# Patient Record
Sex: FEMALE | Race: WHITE | Employment: FULL TIME | URBAN - METROPOLITAN AREA
[De-identification: names, ages, dates, MRNs, and addresses within clinical notes are randomized per-mention and may not be internally consistent; named-entity substitution may affect disease eponyms.]

---

## 2018-10-18 PROBLEM — Z23 ENCOUNTER FOR IMMUNIZATION: Status: ACTIVE | Noted: 2018-10-18

## 2018-10-18 PROBLEM — Z34.90 PREGNANCY: Status: ACTIVE | Noted: 2018-10-18

## 2018-10-26 PROBLEM — O23.40 UTI IN PREGNANCY: Status: ACTIVE | Noted: 2018-10-26

## 2018-10-26 PROBLEM — Z28.39 RUBELLA NON-IMMUNE STATUS, ANTEPARTUM: Status: ACTIVE | Noted: 2018-10-26

## 2018-10-26 PROBLEM — O09.899 RUBELLA NON-IMMUNE STATUS, ANTEPARTUM: Status: ACTIVE | Noted: 2018-10-26

## 2019-01-08 PROBLEM — O43.102 PLACENTAL ABNORMALITY IN SECOND TRIMESTER: Status: ACTIVE | Noted: 2019-01-08

## 2019-04-29 PROBLEM — B95.1 POSITIVE GBS TEST: Status: ACTIVE | Noted: 2019-04-29

## 2019-05-24 NOTE — PROGRESS NOTES
Patient ID verified. Allergies, medical history, prenatal record and prior to admission medications verified. Pt instructed to be NPO after midnight. Pt instructed to call @ 1500 for the time to arrive at hospital, come to entrance C and sign in at the registration desk on the 4th floor. Patient instructed to come to hospital sooner if SROM, labor, or concerning symptoms. Patient verbalized understanding. Questions encouraged and answered.

## 2019-05-27 ENCOUNTER — HOSPITAL ENCOUNTER (INPATIENT)
Age: 29
LOS: 3 days | Discharge: HOME | End: 2019-05-30
Attending: OBSTETRICS & GYNECOLOGY | Admitting: OBSTETRICS & GYNECOLOGY
Payer: COMMERCIAL

## 2019-05-27 DIAGNOSIS — Z34.90 ENCOUNTER FOR PLANNED INDUCTION OF LABOR: Primary | ICD-10-CM

## 2019-05-27 PROBLEM — Z3A.40 40 WEEKS GESTATION OF PREGNANCY: Status: ACTIVE | Noted: 2019-05-27

## 2019-05-27 LAB
ABO + RH BLD: NORMAL
BLOOD GROUP ANTIBODIES SERPL: NORMAL
ERYTHROCYTE [DISTWIDTH] IN BLOOD BY AUTOMATED COUNT: 12.1 % (ref 11.9–14.6)
HCT VFR BLD AUTO: 34.8 % (ref 35.8–46.3)
HGB BLD-MCNC: 11.8 G/DL (ref 11.7–15.4)
MCH RBC QN AUTO: 32.5 PG (ref 26.1–32.9)
MCHC RBC AUTO-ENTMCNC: 33.9 G/DL (ref 31.4–35)
MCV RBC AUTO: 95.9 FL (ref 79.6–97.8)
NRBC # BLD: 0 K/UL (ref 0–0.2)
PLATELET # BLD AUTO: 269 K/UL (ref 150–450)
PMV BLD AUTO: 10.7 FL (ref 9.4–12.3)
RBC # BLD AUTO: 3.63 M/UL (ref 4.05–5.2)
SPECIMEN EXP DATE BLD: NORMAL
WBC # BLD AUTO: 9.5 K/UL (ref 4.3–11.1)

## 2019-05-27 PROCEDURE — 65270000029 HC RM PRIVATE

## 2019-05-27 PROCEDURE — 74011250636 HC RX REV CODE- 250/636: Performed by: OBSTETRICS & GYNECOLOGY

## 2019-05-27 PROCEDURE — 86900 BLOOD TYPING SEROLOGIC ABO: CPT

## 2019-05-27 PROCEDURE — 74011000258 HC RX REV CODE- 258: Performed by: OBSTETRICS & GYNECOLOGY

## 2019-05-27 PROCEDURE — 85027 COMPLETE CBC AUTOMATED: CPT

## 2019-05-27 PROCEDURE — 74011250637 HC RX REV CODE- 250/637: Performed by: OBSTETRICS & GYNECOLOGY

## 2019-05-27 RX ORDER — BUTORPHANOL TARTRATE 2 MG/ML
1 INJECTION INTRAMUSCULAR; INTRAVENOUS
Status: DISCONTINUED | OUTPATIENT
Start: 2019-05-27 | End: 2019-05-29 | Stop reason: HOSPADM

## 2019-05-27 RX ORDER — ACETAMINOPHEN 500 MG
1000 TABLET ORAL
Status: DISCONTINUED | OUTPATIENT
Start: 2019-05-27 | End: 2019-05-29

## 2019-05-27 RX ORDER — OXYTOCIN/RINGER'S LACTATE 15/250 ML
250 PLASTIC BAG, INJECTION (ML) INTRAVENOUS ONCE
Status: ACTIVE | OUTPATIENT
Start: 2019-05-27 | End: 2019-05-28

## 2019-05-27 RX ORDER — LIDOCAINE HYDROCHLORIDE 10 MG/ML
1 INJECTION INFILTRATION; PERINEURAL
Status: ACTIVE | OUTPATIENT
Start: 2019-05-27 | End: 2019-05-28

## 2019-05-27 RX ORDER — ONDANSETRON 4 MG/1
4 TABLET, ORALLY DISINTEGRATING ORAL
Status: DISCONTINUED | OUTPATIENT
Start: 2019-05-27 | End: 2019-05-29

## 2019-05-27 RX ORDER — DEXTROSE, SODIUM CHLORIDE, SODIUM LACTATE, POTASSIUM CHLORIDE, AND CALCIUM CHLORIDE 5; .6; .31; .03; .02 G/100ML; G/100ML; G/100ML; G/100ML; G/100ML
125 INJECTION, SOLUTION INTRAVENOUS CONTINUOUS
Status: DISCONTINUED | OUTPATIENT
Start: 2019-05-27 | End: 2019-05-29

## 2019-05-27 RX ORDER — LIDOCAINE HYDROCHLORIDE 20 MG/ML
JELLY TOPICAL
Status: DISPENSED | OUTPATIENT
Start: 2019-05-27 | End: 2019-05-28

## 2019-05-27 RX ORDER — MINERAL OIL
120 OIL (ML) ORAL
Status: ACTIVE | OUTPATIENT
Start: 2019-05-27 | End: 2019-05-28

## 2019-05-27 RX ORDER — SODIUM CHLORIDE 0.9 % (FLUSH) 0.9 %
5-40 SYRINGE (ML) INJECTION AS NEEDED
Status: DISCONTINUED | OUTPATIENT
Start: 2019-05-27 | End: 2019-05-29

## 2019-05-27 RX ORDER — OXYTOCIN/RINGER'S LACTATE 30/500 ML
0-25 PLASTIC BAG, INJECTION (ML) INTRAVENOUS
Status: DISCONTINUED | OUTPATIENT
Start: 2019-05-27 | End: 2019-05-29 | Stop reason: HOSPADM

## 2019-05-27 RX ORDER — ZOLPIDEM TARTRATE 5 MG/1
5 TABLET ORAL
Status: DISCONTINUED | OUTPATIENT
Start: 2019-05-27 | End: 2019-05-29

## 2019-05-27 RX ORDER — SODIUM CHLORIDE 0.9 % (FLUSH) 0.9 %
5-40 SYRINGE (ML) INJECTION EVERY 8 HOURS
Status: DISCONTINUED | OUTPATIENT
Start: 2019-05-27 | End: 2019-05-29

## 2019-05-27 RX ADMIN — MISOPROSTOL 50 MCG: 100 TABLET ORAL at 18:03

## 2019-05-27 RX ADMIN — SODIUM CHLORIDE 5 MILLION UNITS: 900 INJECTION, SOLUTION INTRAVENOUS at 22:11

## 2019-05-27 RX ADMIN — MISOPROSTOL 50 MCG: 100 TABLET ORAL at 22:10

## 2019-05-27 RX ADMIN — SODIUM CHLORIDE, SODIUM LACTATE, POTASSIUM CHLORIDE, CALCIUM CHLORIDE, AND DEXTROSE MONOHYDRATE 125 ML/HR: 600; 310; 30; 20; 5 INJECTION, SOLUTION INTRAVENOUS at 18:04

## 2019-05-27 NOTE — PROGRESS NOTES
Pt admitted to  430 for Cytotec tonight and induction in AM.  IV started and labs sent. SVE 1cm. FHR reactive.   Cytotec 50 given buccal.

## 2019-05-27 NOTE — PROGRESS NOTES
Spoke with Dr. Kimberlee Dexter on the phone regarding patient's status and POC. MD stated to give 50mcg cytotec Q4hr PRN, and start PCN with second dose of cytotec. Per MD patient may receive epidural and RN may start pit around 0500 if SVE >3cm. PRN med orders received. Will place orders.

## 2019-05-28 ENCOUNTER — ANESTHESIA (OUTPATIENT)
Dept: LABOR AND DELIVERY | Age: 29
End: 2019-05-28
Payer: COMMERCIAL

## 2019-05-28 ENCOUNTER — ANESTHESIA EVENT (OUTPATIENT)
Dept: LABOR AND DELIVERY | Age: 29
End: 2019-05-28
Payer: COMMERCIAL

## 2019-05-28 LAB
ARTERIAL PATENCY WRIST A: ABNORMAL
ARTERIAL PATENCY WRIST A: ABNORMAL
BASE DEFICIT BLD-SCNC: 6 MMOL/L
BASE DEFICIT BLD-SCNC: 7 MMOL/L
BDY SITE: ABNORMAL
BDY SITE: ABNORMAL
BODY TEMPERATURE: 98.6
BODY TEMPERATURE: 98.6
CO2 BLD-SCNC: 21 MMOL/L
CO2 BLD-SCNC: 23 MMOL/L
COLLECT TIME,HTIME: 2250
COLLECT TIME,HTIME: 2250
GAS FLOW.O2 O2 DELIVERY SYS: ABNORMAL L/MIN
GAS FLOW.O2 O2 DELIVERY SYS: ABNORMAL L/MIN
HCO3 BLD-SCNC: 21.1 MMOL/L (ref 22–26)
HCO3 BLDV-SCNC: 19.6 MMOL/L (ref 23–28)
PCO2 BLDCO: 39 MMHG (ref 32–68)
PCO2 BLDCO: 54 MMHG (ref 32–68)
PH BLDCO: 7.2 [PH] (ref 7.15–7.38)
PH BLDCO: 7.31 [PH] (ref 7.15–7.38)
PO2 BLDCO: 12 MMHG
PO2 BLDCO: 26 MMHG
SAO2 % BLD: 9 % (ref 95–98)
SAO2 % BLDV: 42 % (ref 65–88)
SERVICE CMNT-IMP: ABNORMAL
SERVICE CMNT-IMP: ABNORMAL
SPECIMEN TYPE: ABNORMAL
SPECIMEN TYPE: ABNORMAL

## 2019-05-28 PROCEDURE — 74011250637 HC RX REV CODE- 250/637: Performed by: OBSTETRICS & GYNECOLOGY

## 2019-05-28 PROCEDURE — 74011250636 HC RX REV CODE- 250/636

## 2019-05-28 PROCEDURE — 74011250636 HC RX REV CODE- 250/636: Performed by: OBSTETRICS & GYNECOLOGY

## 2019-05-28 PROCEDURE — 77030020255 HC SOL INJ LR 1000ML BG

## 2019-05-28 PROCEDURE — A4300 CATH IMPL VASC ACCESS PORTAL: HCPCS | Performed by: NURSE ANESTHETIST, CERTIFIED REGISTERED

## 2019-05-28 PROCEDURE — 0KQM0ZZ REPAIR PERINEUM MUSCLE, OPEN APPROACH: ICD-10-PCS | Performed by: OBSTETRICS & GYNECOLOGY

## 2019-05-28 PROCEDURE — 77030002888 HC SUT CHRMC J&J -A

## 2019-05-28 PROCEDURE — 10907ZC DRAINAGE OF AMNIOTIC FLUID, THERAPEUTIC FROM PRODUCTS OF CONCEPTION, VIA NATURAL OR ARTIFICIAL OPENING: ICD-10-PCS | Performed by: OBSTETRICS & GYNECOLOGY

## 2019-05-28 PROCEDURE — 77030011945 HC CATH URIN INT ST MENT -A

## 2019-05-28 PROCEDURE — 77030018846 HC SOL IRR STRL H20 ICUM -A

## 2019-05-28 PROCEDURE — 77030014125 HC TY EPDRL BBMI -B: Performed by: NURSE ANESTHETIST, CERTIFIED REGISTERED

## 2019-05-28 PROCEDURE — 3E033VJ INTRODUCTION OF OTHER HORMONE INTO PERIPHERAL VEIN, PERCUTANEOUS APPROACH: ICD-10-PCS | Performed by: OBSTETRICS & GYNECOLOGY

## 2019-05-28 PROCEDURE — 65270000029 HC RM PRIVATE

## 2019-05-28 PROCEDURE — 4A1HXCZ MONITORING OF PRODUCTS OF CONCEPTION, CARDIAC RATE, EXTERNAL APPROACH: ICD-10-PCS | Performed by: OBSTETRICS & GYNECOLOGY

## 2019-05-28 PROCEDURE — 77030011943

## 2019-05-28 PROCEDURE — 82803 BLOOD GASES ANY COMBINATION: CPT

## 2019-05-28 RX ORDER — FENTANYL CITRATE 50 UG/ML
INJECTION, SOLUTION INTRAMUSCULAR; INTRAVENOUS
Status: ACTIVE
Start: 2019-05-28 | End: 2019-05-28

## 2019-05-28 RX ORDER — MINERAL OIL
120 OIL (ML) ORAL AS NEEDED
Status: DISCONTINUED | OUTPATIENT
Start: 2019-05-28 | End: 2019-05-29

## 2019-05-28 RX ORDER — ONDANSETRON 2 MG/ML
4 INJECTION INTRAMUSCULAR; INTRAVENOUS
Status: COMPLETED | OUTPATIENT
Start: 2019-05-28 | End: 2019-05-28

## 2019-05-28 RX ORDER — FENTANYL CITRATE 50 UG/ML
INJECTION, SOLUTION INTRAMUSCULAR; INTRAVENOUS AS NEEDED
Status: DISCONTINUED | OUTPATIENT
Start: 2019-05-28 | End: 2019-05-28 | Stop reason: HOSPADM

## 2019-05-28 RX ORDER — ROPIVACAINE HYDROCHLORIDE 2 MG/ML
INJECTION, SOLUTION EPIDURAL; INFILTRATION; PERINEURAL
Status: DISCONTINUED | OUTPATIENT
Start: 2019-05-28 | End: 2019-05-28 | Stop reason: HOSPADM

## 2019-05-28 RX ORDER — ROPIVACAINE HYDROCHLORIDE 2 MG/ML
INJECTION, SOLUTION EPIDURAL; INFILTRATION; PERINEURAL AS NEEDED
Status: DISCONTINUED | OUTPATIENT
Start: 2019-05-28 | End: 2019-05-28 | Stop reason: HOSPADM

## 2019-05-28 RX ADMIN — SODIUM CHLORIDE, SODIUM LACTATE, POTASSIUM CHLORIDE, CALCIUM CHLORIDE, AND DEXTROSE MONOHYDRATE 125 ML/HR: 600; 310; 30; 20; 5 INJECTION, SOLUTION INTRAVENOUS at 19:31

## 2019-05-28 RX ADMIN — PENICILLIN G POTASSIUM 2.5 MILLION UNITS: 20000000 POWDER, FOR SOLUTION INTRAVENOUS at 10:53

## 2019-05-28 RX ADMIN — SODIUM CHLORIDE, SODIUM LACTATE, POTASSIUM CHLORIDE, AND CALCIUM CHLORIDE 500 ML: 600; 310; 30; 20 INJECTION, SOLUTION INTRAVENOUS at 17:34

## 2019-05-28 RX ADMIN — ROPIVACAINE HYDROCHLORIDE 10 ML/HR: 2 INJECTION, SOLUTION EPIDURAL; INFILTRATION; PERINEURAL at 10:45

## 2019-05-28 RX ADMIN — OXYTOCIN 6 MILLI-UNITS/MIN: 10 INJECTION, SOLUTION INTRAMUSCULAR; INTRAVENOUS at 08:30

## 2019-05-28 RX ADMIN — PENICILLIN G POTASSIUM 2.5 MILLION UNITS: 20000000 POWDER, FOR SOLUTION INTRAVENOUS at 13:55

## 2019-05-28 RX ADMIN — FENTANYL CITRATE 100 MCG: 50 INJECTION, SOLUTION INTRAMUSCULAR; INTRAVENOUS at 10:45

## 2019-05-28 RX ADMIN — MINERAL OIL 120 ML: 471.95 OIL ORAL at 21:20

## 2019-05-28 RX ADMIN — SODIUM CHLORIDE, SODIUM LACTATE, POTASSIUM CHLORIDE, CALCIUM CHLORIDE, AND DEXTROSE MONOHYDRATE 125 ML/HR: 600; 310; 30; 20; 5 INJECTION, SOLUTION INTRAVENOUS at 12:41

## 2019-05-28 RX ADMIN — ROPIVACAINE HYDROCHLORIDE 8 ML: 2 INJECTION, SOLUTION EPIDURAL; INFILTRATION; PERINEURAL at 10:45

## 2019-05-28 RX ADMIN — PENICILLIN G POTASSIUM 2.5 MILLION UNITS: 20000000 POWDER, FOR SOLUTION INTRAVENOUS at 18:28

## 2019-05-28 RX ADMIN — PENICILLIN G POTASSIUM 2.5 MILLION UNITS: 20000000 POWDER, FOR SOLUTION INTRAVENOUS at 06:05

## 2019-05-28 RX ADMIN — OXYTOCIN 2 MILLI-UNITS/MIN: 10 INJECTION, SOLUTION INTRAMUSCULAR; INTRAVENOUS at 07:22

## 2019-05-28 RX ADMIN — ONDANSETRON 4 MG: 2 INJECTION INTRAMUSCULAR; INTRAVENOUS at 18:28

## 2019-05-28 RX ADMIN — ONDANSETRON 4 MG: 4 TABLET, ORALLY DISINTEGRATING ORAL at 23:23

## 2019-05-28 RX ADMIN — PENICILLIN G POTASSIUM 2.5 MILLION UNITS: 20000000 POWDER, FOR SOLUTION INTRAVENOUS at 22:40

## 2019-05-28 RX ADMIN — OXYTOCIN 250 MILLI-UNITS/MIN: 10 INJECTION, SOLUTION INTRAMUSCULAR; INTRAVENOUS at 23:11

## 2019-05-28 RX ADMIN — PENICILLIN G POTASSIUM 2.5 MILLION UNITS: 20000000 POWDER, FOR SOLUTION INTRAVENOUS at 02:19

## 2019-05-28 RX ADMIN — MISOPROSTOL 50 MCG: 100 TABLET ORAL at 02:58

## 2019-05-28 NOTE — PROGRESS NOTES
Labor Progress Note  Patient seen, fetal heart rate and contraction pattern evaluated, patient examined. Patient Vitals for the past 1 hrs:   BP Pulse   05/28/19 1049 113/58 64   05/28/19 1048 139/56 72   05/28/19 1046 118/78 77   05/28/19 1045 111/71 87   05/28/19 1044 116/77 99   05/28/19 1037 118/84 87   05/28/19 1031 115/69 81       Physical Exam:  Cervical Exam:  2/50 %/-2/   Membranes:  Artificial Rupture of Membranes;  Amniotic Fluid: medium amount of clear fluid  Uterine Activity: Frequency: Every 2 minutes  Fetal Heart Rate: reassuring    Assessment/Plan:  Reassuring fetal status continue induction for post dates

## 2019-05-28 NOTE — PROGRESS NOTES
Labor Progress Note  Patient seen, fetal heart rate and contraction pattern evaluated, patient examined. No data found.     Physical Exam:  Cervical Exam:  3/75 %/-2/    Uterine Activity: Frequency: Every 3 minutes  Fetal Heart Rate: reassuring    Assessment/Plan:  Reassuring fetal status, continue induction

## 2019-05-28 NOTE — PROGRESS NOTES
1030 Anesthesia in room. Pt to side of bed. 1034 Timeout per anesthesia record. 1043 Epidural cath in place. Test dose given. Serial BP as documented. 1049 Pt to right hip tilt.

## 2019-05-28 NOTE — PROGRESS NOTES
02:58 Medication Given miSOPROStol (CYTOTEC) tablet (prepacked) 50 mcg -  Dose: 50 mcg ; Route: Buccal  Gin LOCKWOOD 2/50/-2.

## 2019-05-28 NOTE — PROGRESS NOTES
02:19 Medication New Bag penicillin G pot (PFIZERPEN) 2.5 Million Units in 50 ml 0.9% NaCl -  Dose: 2.5 Million Units ; Rate: 100 mL/hr ; Route: IntraVENous ; Line: Peripheral IV 05/27/19 Distal;Posterior;Right Forearm ; Scheduled Time: 0200      Cytotec held at this time. Ctx q2-3 mins. Patient up to restroom. Will reevaluate in 30 minutes.

## 2019-05-28 NOTE — PROGRESS NOTES
06:05 Medication New Bag penicillin G pot (PFIZERPEN) 2.5 Million Units in 50 ml 0.9% NaCl -  Dose: 2.5 Million Units ; Rate: 100 mL/hr ; Route: IntraVENous ; Line: Peripheral IV 05/27/19 Distal;Posterior;Right Forearm ; Scheduled Time: 0600

## 2019-05-28 NOTE — ANESTHESIA PROCEDURE NOTES
Epidural Block    Start time: 5/28/2019 10:34 AM  End time: 5/28/2019 10:44 AM  Performed by: Vicky Catalan MD  Authorized by: Vicky Catalan MD     Pre-Procedure  Indication: at surgeon's request, procedure for pain and labor epidural    Preanesthetic Checklist: patient identified, risks and benefits discussed, anesthesia consent, site marked, patient being monitored, timeout performed and anesthesia consent      Epidural:   Patient position:  Seated  Prep region:  Lumbar  Prep: Chlorhexidine    Location:  L2-3    Needle and Epidural Catheter:   Needle Type:  Tuohy  Needle Gauge:  19 G  Injection Technique:  Loss of resistance using air and loss of resistance using saline  Attempts:  1  Catheter Size:  20 G  Catheter at Skin Depth (cm):  10  Depth in Epidural Space (cm):  5  Events: no blood with aspiration, no cerebrospinal fluid with aspiration, no paresthesia and negative aspiration test    Test Dose:  Lidocaine 1.5% w/ epi and negative    Assessment:   Catheter Secured:  Tegaderm and tape  Insertion:  Uncomplicated  Patient tolerance:  Patient tolerated the procedure well with no immediate complications

## 2019-05-28 NOTE — H&P
History & Physical    Name: Demetrius Pedraza MRN: [id number]  SSN: [id number]    YOB: 1990  Age: 33 y.o. Sex: female      Subjective:     Estimated Date of Delivery: 19  OB History    Para Term  AB Living   1 0 0 0 0 0   SAB TAB Ectopic Molar Multiple Live Births   0 0 0 0 0 0      # Outcome Date GA Lbr Chandan/2nd Weight Sex Delivery Anes PTL Lv   1 Current                Ms. Trevor Pena is admitted with pregnancy at 40w3d for induction of labor due to post dates. Prenatal course was normal.  Please see prenatal records for details. History reviewed. No pertinent past medical history. Past Surgical History:   Procedure Laterality Date    HX WISDOM TEETH EXTRACTION       Social History     Occupational History    Not on file   Tobacco Use    Smoking status: Never Smoker    Smokeless tobacco: Never Used   Substance and Sexual Activity    Alcohol use: No     Frequency: Never     Comment: rare    Drug use: No    Sexual activity: Yes     Partners: Male     Birth control/protection: None     Family History   Problem Relation Age of Onset    Diabetes Mother     Hypertension Father     Breast Cancer Paternal Aunt     Cancer Paternal Grandmother        Allergies   Allergen Reactions    Animal Dander Sneezing    Other Plant, Animal, Environmental Sneezing     Prior to Admission medications    Medication Sig Start Date End Date Taking? Authorizing Provider   PNV Comb No.59/Iron/FA/DHA (PRENATAL-DHA PO) Take  by mouth. Yes Provider, Historical   cetirizine (ZYRTEC) 10 mg tablet Take  by mouth. Yes Provider, Historical   pseudoephedrine (SUDAFED) 30 mg tablet Take  by mouth every four (4) hours as needed for Congestion. Yes Provider, Historical   raNITIdine (ZANTAC) 150 mg tablet Take 150 mg by mouth two (2) times a day.     Provider, Historical        Review of Systems: A comprehensive review of systems was negative except for that written in the History of Present Illness. Objective:     Vitals:  Vitals:    05/27/19 2316 05/28/19 0223 05/28/19 0331 05/28/19 0400   BP: 109/71 110/68 114/70 107/65   Pulse: 68 65 61 (!) 56   Resp:  16     Temp:  98 °F (36.7 °C)     Weight:       Height:            Physical Exam:  Patient without distress. Heart: Regular rate and rhythm  Lung: clear to auscultation throughout lung fields, no wheezes, no rales, no rhonchi and normal respiratory effort  Cervical Exam: 2/50 %/-2/   Membranes:  attempted AROM minimal fluid seen  Fetal Heart Rate: reassuring          Prenatal Labs:   Lab Results   Component Value Date/Time    ABO/Rh(D) A POSITIVE 05/27/2019 05:58 PM    Rubella, External Non-Immune 10/18/2018    HBsAg, External Negative 10/18/2018    HIV, External Non-Reactive 10/18/2018    RPR, External Non-Reactive 10/18/2018    ABO,Rh A positive 10/18/2018    GrBStrep, External Positive 04/25/2019       Impression/Plan:     Active Problems:    40 weeks gestation of pregnancy (5/27/2019)      Encounter for planned induction of labor (5/27/2019)         Plan: Admit for induction of labor. Group B Strep positive, will treat prophylactically with penicillin.

## 2019-05-28 NOTE — ANESTHESIA PREPROCEDURE EVALUATION
Relevant Problems   No relevant active problems       Anesthetic History   No history of anesthetic complications            Review of Systems / Medical History  Patient summary reviewed and pertinent labs reviewed    Pulmonary  Within defined limits                 Neuro/Psych   Within defined limits           Cardiovascular                  Exercise tolerance: >4 METS     GI/Hepatic/Renal  Within defined limits              Endo/Other  Within defined limits           Other Findings              Physical Exam    Airway  Mallampati: II  TM Distance: 4 - 6 cm  Neck ROM: normal range of motion   Mouth opening: Normal     Cardiovascular    Rhythm: regular  Rate: normal         Dental         Pulmonary  Breath sounds clear to auscultation               Abdominal         Other Findings            Anesthetic Plan    ASA: 1  Anesthesia type: epidural            Anesthetic plan and risks discussed with: Patient and Spouse

## 2019-05-28 NOTE — PROGRESS NOTES
22:10 Medication Given miSOPROStol (CYTOTEC) tablet (prepacked) 50 mcg -  Dose: 50 mcg ; Route: Buccal ; Comment: SVE 1.5/50/-2  [place] Reasons R   22:11 Medication New Bag penicillin G potassium (PFIZERPEN) 5 Million Units in 0.9% sodium chloride (MBP/ADV) 100 mL -  Dose: 5 Million Units ; Rate: 200 mL/hr ; Route: IntraVENous ; Line: Peripheral IV 05/27/19 Distal;Posterior;Right Forearm ; Scheduled Time: 2200       SVE 1.5/50/-2.

## 2019-05-29 LAB
BASOPHILS # BLD: 0 K/UL (ref 0–0.2)
BASOPHILS NFR BLD: 0 % (ref 0–2)
DIFFERENTIAL METHOD BLD: ABNORMAL
EOSINOPHIL # BLD: 0 K/UL (ref 0–0.8)
EOSINOPHIL NFR BLD: 0 % (ref 0.5–7.8)
ERYTHROCYTE [DISTWIDTH] IN BLOOD BY AUTOMATED COUNT: 12.1 % (ref 11.9–14.6)
HCT VFR BLD AUTO: 25.3 % (ref 35.8–46.3)
HGB BLD-MCNC: 8.4 G/DL (ref 11.7–15.4)
IMM GRANULOCYTES # BLD AUTO: 0.1 K/UL (ref 0–0.5)
IMM GRANULOCYTES NFR BLD AUTO: 0 % (ref 0–5)
LYMPHOCYTES # BLD: 0.9 K/UL (ref 0.5–4.6)
LYMPHOCYTES NFR BLD: 5 % (ref 13–44)
MCH RBC QN AUTO: 32.4 PG (ref 26.1–32.9)
MCHC RBC AUTO-ENTMCNC: 33.2 G/DL (ref 31.4–35)
MCV RBC AUTO: 97.7 FL (ref 79.6–97.8)
MONOCYTES # BLD: 1.8 K/UL (ref 0.1–1.3)
MONOCYTES NFR BLD: 9 % (ref 4–12)
NEUTS SEG # BLD: 17.5 K/UL (ref 1.7–8.2)
NEUTS SEG NFR BLD: 86 % (ref 43–78)
NRBC # BLD: 0 K/UL (ref 0–0.2)
PLATELET # BLD AUTO: 204 K/UL (ref 150–450)
PMV BLD AUTO: 10.7 FL (ref 9.4–12.3)
RBC # BLD AUTO: 2.59 M/UL (ref 4.05–5.2)
WBC # BLD AUTO: 20.4 K/UL (ref 4.3–11.1)

## 2019-05-29 PROCEDURE — 76060000078 HC EPIDURAL ANESTHESIA

## 2019-05-29 PROCEDURE — 65270000029 HC RM PRIVATE

## 2019-05-29 PROCEDURE — 75410000000 HC DELIVERY VAGINAL/SINGLE

## 2019-05-29 PROCEDURE — 74011250637 HC RX REV CODE- 250/637: Performed by: OBSTETRICS & GYNECOLOGY

## 2019-05-29 PROCEDURE — 74011250636 HC RX REV CODE- 250/636: Performed by: OBSTETRICS & GYNECOLOGY

## 2019-05-29 PROCEDURE — 36415 COLL VENOUS BLD VENIPUNCTURE: CPT

## 2019-05-29 PROCEDURE — 85025 COMPLETE CBC W/AUTO DIFF WBC: CPT

## 2019-05-29 PROCEDURE — 75410000002 HC LABOR FEE PER 1 HR

## 2019-05-29 PROCEDURE — 75410000003 HC RECOV DEL/VAG/CSECN EA 0.5 HR

## 2019-05-29 PROCEDURE — 77030020255 HC SOL INJ LR 1000ML BG

## 2019-05-29 RX ORDER — IBUPROFEN 800 MG/1
800 TABLET ORAL
Status: DISCONTINUED | OUTPATIENT
Start: 2019-05-29 | End: 2019-05-30 | Stop reason: HOSPADM

## 2019-05-29 RX ORDER — OXYCODONE AND ACETAMINOPHEN 5; 325 MG/1; MG/1
1 TABLET ORAL
Status: DISCONTINUED | OUTPATIENT
Start: 2019-05-29 | End: 2019-05-30 | Stop reason: HOSPADM

## 2019-05-29 RX ORDER — DOCUSATE SODIUM 100 MG/1
100 CAPSULE, LIQUID FILLED ORAL 2 TIMES DAILY
Status: DISCONTINUED | OUTPATIENT
Start: 2019-05-29 | End: 2019-05-30 | Stop reason: HOSPADM

## 2019-05-29 RX ORDER — SIMETHICONE 80 MG
80 TABLET,CHEWABLE ORAL
Status: DISCONTINUED | OUTPATIENT
Start: 2019-05-29 | End: 2019-05-30 | Stop reason: HOSPADM

## 2019-05-29 RX ORDER — SODIUM CHLORIDE, SODIUM LACTATE, POTASSIUM CHLORIDE, CALCIUM CHLORIDE 600; 310; 30; 20 MG/100ML; MG/100ML; MG/100ML; MG/100ML
75 INJECTION, SOLUTION INTRAVENOUS CONTINUOUS
Status: DISCONTINUED | OUTPATIENT
Start: 2019-05-29 | End: 2019-05-29

## 2019-05-29 RX ORDER — SODIUM CHLORIDE, SODIUM LACTATE, POTASSIUM CHLORIDE, CALCIUM CHLORIDE 600; 310; 30; 20 MG/100ML; MG/100ML; MG/100ML; MG/100ML
150 INJECTION, SOLUTION INTRAVENOUS
Status: COMPLETED | OUTPATIENT
Start: 2019-05-29 | End: 2019-05-29

## 2019-05-29 RX ORDER — DIPHENHYDRAMINE HCL 25 MG
25 CAPSULE ORAL
Status: DISCONTINUED | OUTPATIENT
Start: 2019-05-29 | End: 2019-05-30 | Stop reason: HOSPADM

## 2019-05-29 RX ORDER — ONDANSETRON 4 MG/1
4 TABLET, ORALLY DISINTEGRATING ORAL
Status: ACTIVE | OUTPATIENT
Start: 2019-05-29 | End: 2019-05-30

## 2019-05-29 RX ADMIN — DOCUSATE SODIUM 100 MG: 100 CAPSULE, LIQUID FILLED ORAL at 15:52

## 2019-05-29 RX ADMIN — IBUPROFEN 800 MG: 800 TABLET, FILM COATED ORAL at 02:55

## 2019-05-29 RX ADMIN — IBUPROFEN 800 MG: 800 TABLET, FILM COATED ORAL at 15:52

## 2019-05-29 RX ADMIN — IBUPROFEN 800 MG: 800 TABLET, FILM COATED ORAL at 08:54

## 2019-05-29 RX ADMIN — DOCUSATE SODIUM 100 MG: 100 CAPSULE, LIQUID FILLED ORAL at 08:53

## 2019-05-29 RX ADMIN — SODIUM CHLORIDE, SODIUM LACTATE, POTASSIUM CHLORIDE, AND CALCIUM CHLORIDE 75 ML/HR: 600; 310; 30; 20 INJECTION, SOLUTION INTRAVENOUS at 03:02

## 2019-05-29 RX ADMIN — SODIUM CHLORIDE, SODIUM LACTATE, POTASSIUM CHLORIDE, AND CALCIUM CHLORIDE 150 ML/HR: 600; 310; 30; 20 INJECTION, SOLUTION INTRAVENOUS at 02:55

## 2019-05-29 RX ADMIN — IBUPROFEN 800 MG: 800 TABLET, FILM COATED ORAL at 21:55

## 2019-05-29 RX ADMIN — WITCH HAZEL 1 PAD: 500 SOLUTION RECTAL; TOPICAL at 02:56

## 2019-05-29 NOTE — L&D DELIVERY NOTE
Delivery Summary    Patient: Demarcus La MRN: [id number]  SSN: [id number]    YOB: 1990  Age: 33 y.o. Sex: female       Information for the patient's :  Lj Dotson [[id number]]       Labor Events:    Labor: No    Steroids: None   Cervical Ripening Date/Time: 2019 6:00 PM   Cervical Ripening Type: Misoprostol   Antibiotics During Labor: Yes   Rupture Identifier: Sac 1    Rupture Date/Time: 2019 11:04 AM   Rupture Type: AROM   Amniotic Fluid Volume: Moderate    Amniotic Fluid Description: Clear    Amniotic Fluid Odor: None    Induction: Oxytocin       Induction Date/Time: 2019 7:22 AM    Indications for Induction: Post-term Gestation    Augmentation: AROM   Augmentation Date/Time:      Indications for Augmentation: Ineffective Contraction Pattern   Labor complications: None       Additional complications:        Delivery Events:  Indications For Episiotomy:     Episiotomy: None   Perineal Laceration(s): 2nd   Repaired: Yes   Periurethral Laceration Location:      Repaired:     Labial Laceration Location:     Repaired:     Sulcal Laceration Location:     Repaired:     Vaginal Laceration Location:     Repaired:     Cervical Laceration Location:     Repaired:     Repair Suture: Chromic 3-0   Number of Repair Packets: 1   Estimated Blood Loss (ml): 300ml     Delivery Date: 2019    Delivery Time: 10:50 PM  Delivery Type: Vaginal, Spontaneous  Sex:  Male    Gestational Age: 44w3d   Delivery Clinician: Baltazar Kumar  Living Status: Living   Delivery Location: L&D 430          APGARS  One minute Five minutes Ten minutes   Skin color: 0   1        Heart rate: 2   2        Grimace: 2   2        Muscle tone: 2   2        Breathin   2        Totals: 8   9            Presentation: Vertex    Position: Right Occiput Anterior  Resuscitation Method:  Suctioning-bulb; Tactile Stimulation     Meconium Stained: None      Cord Information: 3 Vessels  Complications: None  Cord around:    Delayed cord clamping? Yes  Cord clamped date/time:2019 10:51 PM  Disposition of Cord Blood: Lab    Blood Gases Sent?: Yes    Placenta:  Date/Time: 2019 11:11 PM  Removal: Spontaneous      Appearance: Normal;Intact     [place] Measurements:  Birth Weight: 8 lb 8 oz (3.855 kg)      Birth Length: 1' 8.87\" (0.53 m)      Head Circumference: 1' 1.29\" (0.338 m)      Chest Circumference: 1' 1.19\" (0.335 m)     Abdominal Girth: Other Providers:   Author Baylee SHEIKH;;;;BAEZA, Gaile Rom, Obstetrician;Primary Nurse;Primary  Nurse;Neonatologist;Anesthesiologist;Crna;Scrub Tech;Charge Nurse           Group B Strep:   Lab Results   Component Value Date/Time    GrBStrep, External Positive 2019     Information for the patient's :  Geoff Herndon [[id number]]   No results found for: Park Skains, PCTDIG, BILI, ABORHEXT, ABORH    Recent Labs     19  2309 19  2308   PCO2CB 39 47   PO2CB 26 12   HCO3I  --  21.1*   SO2I  --  9*   IBD 6 7   PTEMPI 98.6 98.6   SPECTI VENOUS CORD ARTERIAL CORD   PHICB 7.306 7.204   ISITE CORD CORD   IDEV ROOM AIR ROOM AIR   IALLEN NOT APPLICABLE NOT APPLICABLE      Baby taken to SCN for grunting. Placenta inspected with normal compete disk no evidence of accessory lobe.

## 2019-05-29 NOTE — PROGRESS NOTES
Post-Partum Day Number 1 Progress Note    Patient doing well post-partum without significant complaint. Voiding withour difficulty, normal lochia. Has been getting up without difficulty    Vitals:    Patient Vitals for the past 8 hrs:   BP Temp Pulse Resp SpO2   19 0905 121/61  71     19 0855 104/76  85     19 0735 91/53 97.6 °F (36.4 °C) 73 16 94 %   19 0515 98/58  77 16 97 %   19 0406 105/45 98.1 °F (36.7 °C) 85 16 96 %   19 0258 105/61  92     19 0243 115/61  96     19 0228 109/62  94       Temp (24hrs), Av.4 °F (36.9 °C), Min:97.6 °F (36.4 °C), Max:99.4 °F (37.4 °C)      Vital signs stable, afebrile. Exam:  Patient without distress. Abdomen soft, fundus firm at level of umbilicus, nontender               Lower extremities are negative for swelling, cords or tenderness. Lab/Data Review:  CBC:   Lab Results   Component Value Date/Time    WBC 20.4 (H) 2019 02:15 AM    HGB 8.4 (L) 2019 02:15 AM    HCT 25.3 (L) 2019 02:15 AM     2019 02:15 AM       Assessment and Plan:  Patient appears to be having uncomplicated post-partum course. Continue routine perineal care and maternal education. Plan discharge tomorrow if no problems occur.

## 2019-05-29 NOTE — PROGRESS NOTES
Admission assessment complete as noted. Patient oriented to room and unit. Plan of care reviewed and patient verbalizes understanding. Questions encouraged and answered. Patent encouraged to call for needs or concerns. Pt on strict I&Os per order. Educated pt on how to keep I&Os. Verbalized understanding. Educated pt to call before getting out of bed. Must have assistance with ambulation. Verbalized understanding. Pt sitting up in bed with her Mother at bedside. Motrin given per MAR.

## 2019-05-29 NOTE — PROGRESS NOTES
Spoke with Dr Felicitas Seaman regarding daily weight, discontinue daily weight, I & O's but not strict, routine vitals

## 2019-05-29 NOTE — PROGRESS NOTES
Chart reviewed - first time parent, baby currently in the NICU (respiratory distress).  provided education and pamphlet on [facility] Home Visit Program.  Family was undecided on need for home visit. No referral will be made at this time. Family has this 's contact information should they decide to participate in program.  Family feels that they are coping well with baby's admission to the NICU, and they are hopeful that he will return to their room soon. No PCP - list of PCPs provided.  provided informational packet on  mood disorder education/resources. Family receptive to receiving information and denied any additional needs from . Family has 's contact information should any needs/questions arise.     GERRY Jones-SHARYN  [facility]   [phone]

## 2019-05-29 NOTE — PROGRESS NOTES
Patient up to bathroom with 2 person assistance. Ashley-care taught and completed. Questions encouraged and answered. Patient ambulating without difficulty, encouraged to call for needs or concerns. Verbalizes understanding.

## 2019-05-29 NOTE — PROGRESS NOTES
SBAR IN Report: Mother    Verbal report received from Giovana Hayes RN (full name & credentials) on this patient, who is now being transferred from L&D (unit) for routine progression of care. The patient is not wearing a green \"Anesthesia-Duramorph\" band. Report consisted of patient's Situation, Background, Assessment and Recommendations (SBAR). [place] ID bands were compared with the identification form, and verified with the patient and transferring nurse. Information from the SBAR, Intake/Output, MAR and Recent Results and the [place] Report was reviewed with the transferring nurse; opportunity for questions and clarification provided.

## 2019-05-29 NOTE — PROGRESS NOTES
Shift assessment complete as noted. Patient denies needs. Questions encouraged and answered. Encouraged to call for needs or concerns. Verbalizes understanding. Pt ambulating well around room.

## 2019-05-29 NOTE — PROGRESS NOTES
Pt complete at 2113  Called MD to update on pt's SVE and Cx pattern. Orders received to start pushing. Started pushing at 2126.

## 2019-05-29 NOTE — PROGRESS NOTES
SBAR OUT Report: Mother    Verbal report given to Torito Carbajal RN on this patient, who is now being transferred to MIU for routine progression of care. The patient is not wearing a green \"Anesthesia-Duramorph\" band. Report consisted of patient's Situation, Background, Assessment and Recommendations (SBAR). [place] ID bands were compared with the identification form, and verified with the patient and receiving nurse. Information from the SBAR, Procedure Summary, Intake/Output, MAR and Recent Results and the [place] Report was reviewed with the receiving nurse; opportunity for questions and clarification provided. Fundal checks performed with oncoming nurse.

## 2019-05-29 NOTE — ANESTHESIA POSTPROCEDURE EVALUATION
* No procedures listed *.    epidural    Anesthesia Post Evaluation      Multimodal analgesia: multimodal analgesia used between 6 hours prior to anesthesia start to PACU discharge  Patient location during evaluation: PACU  Patient participation: complete - patient participated  Level of consciousness: awake and alert  Pain management: adequate  Airway patency: patent  Anesthetic complications: no  Cardiovascular status: acceptable and hemodynamically stable  Respiratory status: acceptable  Hydration status: acceptable        No vitals data found for the desired time range.

## 2019-05-29 NOTE — PROGRESS NOTES
In and out cath for 200 ccs of zhou urine. sve 9/100/-1    Pt placed on peanut and turned all the way on right side.  Family at bedside

## 2019-05-29 NOTE — PROGRESS NOTES
Pt sitting in bed at 45 degree angle eating jesus crackers. Reports no nausea, dizziness, blurred vision or pain.

## 2019-05-29 NOTE — PROGRESS NOTES
Delivery Note    Dr Edward Bradley arrived to bedside at [phone]. Pt positioned for delivery and set up at [facility] [address]. Spontaneous vaginal delivery of viable male infant @36. Apgar's 8/9. See delivery summary for details.

## 2019-05-29 NOTE — PROGRESS NOTES
Shift assessment complete as noted. Patient out of bed with stand by assistance given, instructed on moving positions slowly and waiting several minutes before standing, voided without difficulty, patient tolerated well, Patient has not seen infant, assisted to wheelchair to go to [facility] to visit infant, Dad escorted patient    Questions encouraged and answered. Encouraged to call for needs or concerns. Verbalizes understanding.

## 2019-05-29 NOTE — PROGRESS NOTES
Notified that as pt being transferred to MIU she had syncopal episode. She was hypotensive. When laid down she recovered well and BP better. She now feels well. Will check CBC but do not think she has had excessive bleeding. She had episodes of hypotension during labor. Will watch closely.

## 2019-05-29 NOTE — PROGRESS NOTES
When ambulating pt to wheelchair to transfer to MIU, pt had brief syncopal episode lasting 10-15 seconds where pt lost conciousness. Reconnected pt to blood pressure cuff and pt's BP was 77/42 and HR was 112. I called out for assistance from my charge nurse to transfer pt to a stretcher and she began receiving bolus of warm LR. Pt recovered and MD was notified. Stat CBC was ordered.

## 2019-05-30 VITALS
RESPIRATION RATE: 16 BRPM | TEMPERATURE: 97.8 F | DIASTOLIC BLOOD PRESSURE: 54 MMHG | SYSTOLIC BLOOD PRESSURE: 94 MMHG | BODY MASS INDEX: 24.14 KG/M2 | HEIGHT: 64 IN | HEART RATE: 75 BPM | WEIGHT: 141.4 LBS | OXYGEN SATURATION: 98 %

## 2019-05-30 PROCEDURE — 90707 MMR VACCINE SC: CPT

## 2019-05-30 PROCEDURE — 74011250637 HC RX REV CODE- 250/637: Performed by: OBSTETRICS & GYNECOLOGY

## 2019-05-30 PROCEDURE — 74011250636 HC RX REV CODE- 250/636

## 2019-05-30 RX ORDER — IBUPROFEN 800 MG/1
800 TABLET ORAL
Qty: 35 TAB | Refills: 1 | Status: SHIPPED | OUTPATIENT
Start: 2019-05-30

## 2019-05-30 RX ADMIN — DOCUSATE SODIUM 100 MG: 100 CAPSULE, LIQUID FILLED ORAL at 15:42

## 2019-05-30 RX ADMIN — MEASLES, MUMPS, AND RUBELLA VIRUS VACCINE LIVE 0.5 ML: 1000; 12500; 1000 INJECTION, POWDER, LYOPHILIZED, FOR SUSPENSION SUBCUTANEOUS at 11:53

## 2019-05-30 RX ADMIN — IBUPROFEN 800 MG: 800 TABLET, FILM COATED ORAL at 03:28

## 2019-05-30 RX ADMIN — IBUPROFEN 800 MG: 800 TABLET, FILM COATED ORAL at 15:42

## 2019-05-30 RX ADMIN — IBUPROFEN 800 MG: 800 TABLET, FILM COATED ORAL at 09:30

## 2019-05-30 RX ADMIN — DOCUSATE SODIUM 100 MG: 100 CAPSULE, LIQUID FILLED ORAL at 09:30

## 2019-05-30 NOTE — PROGRESS NOTES
Post-Partum Day Number 2 Progress Note    Patient doing well  without significant complaints. Pain controlled on current medication. Voiding without difficulty, normal lochia. Denies lightheadedness or dizziness. Vitals:    Visit Vitals  BP 94/54 (BP 1 Location: Right arm, BP Patient Position: At rest)   Pulse 75   Temp 97.8 °F (36.6 °C)   Resp 16   Ht 5' 4\" (1.626 m)   Wt 141 lb 6.4 oz (64.1 kg)   LMP 08/18/2018 (Exact Date)   SpO2 98%   Breastfeeding? Yes   BMI 24.27 kg/m²       Vital signs stable, afebrile. Exam:  Patient without distress. Heart rrr  Lungs cta b&s               Abdomen soft, fundus firm at level of umbilicus, nontender. Lower extremities are negative for swelling, cords or tenderness. Lab Results   Component Value Date/Time    ABO Group A 10/18/2018 11:26 AM    Rh (D) Positive 10/18/2018 11:26 AM    ABO/Rh(D) A POSITIVE 05/27/2019 05:58 PM        Lab Results   Component Value Date/Time    ABO/Rh(D) A POSITIVE 05/27/2019 05:58 PM    Antibody screen NEG 05/27/2019 05:58 PM    Antibody screen, External Negative 10/18/2018    Rubella, External Non-Immune 10/18/2018    GrBStrep, External Positive 04/25/2019    ABO,Rh A positive 10/18/2018     Lab Results   Component Value Date/Time    HGB 8.4 (L) 05/29/2019 02:15 AM    Hgb, External 11.8 10/18/2018         Assessment and Plan:  Patient appears to be having uncomplicated post-partum course. Continue routine post-delivery care and maternal education. Breastfeeding. Acute blood loss anemia -asymptomatic. Will discharge home.

## 2019-05-30 NOTE — PROGRESS NOTES
Safety Teaching reviewed:   1. Hand hygiene prior to handling the infant. 2. Bracelets with matching numbers are placed on mother and infant  3. An infant security tag  [facility]) is placed on the infant's ankle and monitored  4. All OB nurses wear pink Employee badges - do not give your baby to anyone without proper identification. 5. Never leave the baby alone in the room. 6. The infant should be placed on their back to sleep. on a firm mattress. No toys should be placed in the crib. (safe sleep video offered to view)  7. Never shake the baby (video offered to view)  8. Infant fall prevention - do not sleep with the baby, and place the baby in the crib while ambulating. 5. Mother and Baby Care booklet given to Mother. ID braclets matched with parents.

## 2019-05-30 NOTE — DISCHARGE SUMMARY
Obstetrical Discharge Summary     Name: Siena Cardenas MRN: [id number]  SSN: [id number]    YOB: 1990  Age: 33 y.o. Sex: female      Allergies: Animal dander and Other plant, animal, environmental    Admit Date: 2019    Discharge Date: 2019     Admitting Physician: Neelam Hoover MD     Attending Physician:  Juan Jensen MD     * Admission Diagnoses: 40 weeks gestation of pregnancy [Z3A.40]; Encounter for planned induction of labor [Z34.90]    * Discharge Diagnoses:   Information for the patient's :  Black Schuster [[id number]]   Delivery of a 8 lb 8 oz (3.855 kg) male infant via Vaginal, Spontaneous on 2019 at 10:50 PM  by . Apgars were 8 and 9.        Additional Diagnoses:   Hospital Problems as of 2019 Date Reviewed: 2019          Codes Class Noted - Resolved POA    40 weeks gestation of pregnancy ICD-10-CM: Z3A.40  ICD-9-CM: V22.2  2019 - Present Unknown        * (Principal) Encounter for planned induction of labor ICD-10-CM: Z34.90  ICD-9-CM: V22.1  2019 - Present Unknown             Lab Results   Component Value Date/Time    ABO/Rh(D) A POSITIVE 2019 05:58 PM    Rubella, External Non-Immune 10/18/2018    GrBStrep, External Positive 2019    ABO,Rh A positive 10/18/2018      Immunization History   Administered Date(s) Administered    Influenza Vaccine (Quad) PF 2018    Tdap 2019       * Procedures:vaginal delivery     [place]  Depression Scale  I have been able to laugh and see the funny side of things: As much as I always could  I have looked forward with enjoyment to things: As much as I ever did  I have blamed myself unnecessarily when things went wrong: Yes, some of the time  I have been anxious or worried for no good reason: Yes, sometimes  I have felt scared or panicky for no very good reason: No, not much  Things have been getting on top of me: No, I have been coping as well as ever  I have been so unhappy that I have had difficulty sleeping: No, not at all  I have felt sad or miserable: No, not at all  I have been so unhappy that I have been crying: Only occasionally  The thought of harming myself has occurred to me: Never  Total Score: 6    * Discharge Condition: stable    * Hospital Course: Normal hospital course following the delivery. Her pp hg was 8.4. She was deemed stable for discharge home ppd#2. * Disposition: Home    Discharge Medications:   Current Discharge Medication List      START taking these medications    Details   ibuprofen (MOTRIN) 800 mg tablet Take 1 Tab by mouth every eight (8) hours as needed for Pain. Qty: 35 Tab, Refills: 1      Ferrous Fumarate 325 mg (106 mg iron) tab Take 1 Tab by mouth daily. Qty: 20 Tab, Refills: 0         CONTINUE these medications which have NOT CHANGED    Details   PNV Comb No.59/Iron/FA/DHA (PRENATAL-DHA PO) Take  by mouth. STOP taking these medications       cetirizine (ZYRTEC) 10 mg tablet Comments:   Reason for Stopping:         pseudoephedrine (SUDAFED) 30 mg tablet Comments:   Reason for Stopping:         raNITIdine (ZANTAC) 150 mg tablet Comments:   Reason for Stopping:               * Follow-up Care/Patient Instructions:   Activity: No sex for 6 weeks  Diet: Regular Diet  Wound Care: As directed    Follow-up Information     Follow up With Specialties Details Why Contact Info    None    None ([phone]) Patient stated that they have no PCP

## 2019-05-30 NOTE — LACTATION NOTE
This note was copied from a baby's chart. In to see mom and infant for follow up. Mom has not had success w/ latching baby yet. Mom tearful. Attempted w/ mom on both breasts in several positions. Infant just held mouth on breast, no sustained sucking. Stayed w/ mom while she pumped. Got drops. Dad fed back to baby. Reviewed importance of consistent trying at breast, (10 min only if baby not latching by then) and pumping consistently after to get milk supply in. Reviewed normal pumping volumes for first week of life. Dad fed baby a slow flow bottle of formula while mom pumped per their routine they have chosen. Mom aware as she starts to pump larger volumes, offer EBM first, then formula after. Feed 8-12 times per day.  Lactation to follow up in am.

## 2019-05-30 NOTE — PROGRESS NOTES
Patient discharged from Mother Infant room. Discharge teaching complete. Patient verbalizes understanding. Questions encouraged and answered. Patient escorted to Special Care Nursery. Stable at discharge.

## 2019-05-30 NOTE — DISCHARGE INSTRUCTIONS
Patient Education        After Your Delivery (the Postpartum Period): Care Instructions  Your Care Instructions    Congratulations on the birth of your baby. Like pregnancy, the  period can be a time of excitement, reji, and exhaustion. You may look at your wondrous little baby and feel happy. You may also be overwhelmed by your new sleep hours and new responsibilities. At first, babies often sleep during the days and are awake at night. They do not have a pattern or routine. They may make sudden gasps, jerk themselves awake, or look like they have crossed eyes. These are all normal, and they may even make you smile. In these first weeks after delivery, try to take good care of yourself. It may take 4 to 6 weeks to feel like yourself again, and possibly longer if you had a  birth. You will likely feel very tired for several weeks. Your days will be full of ups and downs, but lots of reji as well. Follow-up care is a key part of your treatment and safety. Be sure to make and go to all appointments, and call your doctor if you are having problems. It's also a good idea to know your test results and keep a list of the medicines you take. How can you care for yourself at home? Take care of your body after delivery  · Use pads instead of tampons for the bloody flow that may last as long as 2 weeks. · Ease cramps with ibuprofen (Advil, Motrin). · Ease soreness of hemorrhoids and the area between your vagina and rectum with ice compresses or witch hazel pads. · Ease constipation by drinking lots of fluid and eating high-fiber foods. Ask your doctor about over-the-counter stool softeners. · Cleanse yourself with a gentle squeeze of warm water from a bottle instead of wiping with toilet paper. · Take a sitz bath in warm water several times a day. · Wear a good nursing bra. Ease sore and swollen breasts with warm, wet washcloths.   · If you are not breastfeeding, use ice rather than heat for breast soreness. · Your period may not start for several months if you are breastfeeding. You may bleed more, and longer at first, than you did before you got pregnant. · Wait until you are healed (about 4 to 6 weeks) before you have sexual intercourse. Your doctor will tell you when it is okay to have sex. · Try not to travel with your baby for 5 or 6 weeks. If you take a long car trip, make frequent stops to walk around and stretch. Avoid exhaustion  · Rest every day. Try to nap when your baby naps. · Ask another adult to be with you for a few days after delivery. · Plan for  if you have other children. · Stay flexible so you can eat at odd hours and sleep when you need to. Both you and your baby are making new schedules. · Plan small trips to get out of the house. Change can make you feel less tired. · Ask for help with housework, cooking, and shopping. Remind yourself that your job is to care for your baby. Know about help for postpartum depression  · \"Baby blues\" are common for the first 1 to 2 weeks after birth. You may cry or feel sad or irritable for no reason. · Rest whenever you can. Being tired makes it harder to handle your emotions. · Go for walks with your baby. · Talk to your partner, friends, and family about your feelings. · If your symptoms last for more than a few weeks, or if you feel very depressed, ask your doctor for help. · Postpartum depression can be treated. Support groups and counseling can help. Sometimes medicine can also help. Stay healthy  · Eat healthy foods so you have more energy and lose extra baby pounds. · If you breastfeed, avoid drugs. If you quit smoking during pregnancy, try to stay smoke-free. If you choose to have a drink now and then, have only one drink, and limit the number of occasions that you have a drink. Wait to breastfeed at least 2 hours after you have a drink to reduce the amount of alcohol the baby may get in the milk.   · Start daily exercise after 4 to 6 weeks, but rest when you feel tired. · Learn exercises to tone your belly. Do Kegel exercises to regain strength in your pelvic muscles. You can do these exercises while you stand or sit. ? Squeeze the same muscles you would use to stop your urine. Your belly and thighs should not move. ? Hold the squeeze for 3 seconds, and then relax for 3 seconds. ? Start with 3 seconds. Then add 1 second each week until you are able to squeeze for 10 seconds. ? Repeat the exercise 10 to 15 times for each session. Do three or more sessions each day. · Find a class for new mothers and new babies that has an exercise time. · If you had a  birth, give yourself a bit more time before you exercise, and be careful. When should you call for help? Call 911 anytime you think you may need emergency care. For example, call if:    · You passed out (lost consciousness).    Call your doctor now or seek immediate medical care if:    · You have severe vaginal bleeding. This means you are passing blood clots and soaking through a pad each hour for 2 or more hours.     · You are dizzy or lightheaded, or you feel like you may faint.     · You have a fever.     · You have new belly pain, or your pain gets worse.    Watch closely for changes in your health, and be sure to contact your doctor if:    · Your vaginal bleeding seems to be getting heavier.     · You have new or worse vaginal discharge.     · You feel sad, anxious, or hopeless for more than a few days.     · You do not get better as expected. Where can you learn more? Go to http://briseida-chavo.info/. Enter A461 in the search box to learn more about \"After Your Delivery (the Postpartum Period): Care Instructions. \"  Current as of: 2018  Content Version: 11.9  © 1501-8563 [employer].  Care instructions adapted under license by [employer] (which disclaims liability or warranty for this information). If you have questions about a medical condition or this instruction, always ask your healthcare professional. [address] any warranty or liability for your use of this information. DISCHARGE SUMMARY from Nurse    PATIENT INSTRUCTIONS:    After general anesthesia or intravenous sedation, for 24 hours or while taking prescription Narcotics:  · Limit your activities  · Do not drive and operate hazardous machinery  · Do not make important personal or business decisions  · Do  not drink alcoholic beverages  · If you have not urinated within 8 hours after discharge, please contact your surgeon on call. Report the following to your surgeon:  · Excessive pain, swelling, redness or odor of or around the surgical area  · Temperature over 100.5  · Nausea and vomiting lasting longer than 4 hours or if unable to take medications  · Any signs of decreased circulation or nerve impairment to extremity: change in color, persistent  numbness, tingling, coldness or increase pain  · Any questions    What to do at Home:    Nothing in the vagina for 6 weeks. Call MD if experiencing heavy vaginal bleeding greater than 1 pad per hour, temperature over 100.4, foul smelling vaginal discharge, thoughts of suicide or homicide, symptoms of postpartum depression or mastitis, or any other major medical concerns. *  Please give a list of your current medications to your Primary Care Provider. *  Please update this list whenever your medications are discontinued, doses are      changed, or new medications (including over-the-counter products) are added. *  Please carry medication information at all times in case of emergency situations. These are general instructions for a healthy lifestyle:    No smoking/ No tobacco products/ Avoid exposure to second hand smoke  Surgeon General's Warning:  Quitting smoking now greatly reduces serious risk to your health.     Obesity, smoking, and sedentary lifestyle greatly increases your risk for illness    A healthy diet, regular physical exercise & weight monitoring are important for maintaining a healthy lifestyle    You may be retaining fluid if you have a history of heart failure or if you experience any of the following symptoms:  Weight gain of 3 pounds or more overnight or 5 pounds in a week, increased swelling in our hands or feet or shortness of breath while lying flat in bed. Please call your doctor as soon as you notice any of these symptoms; do not wait until your next office visit. Recognize signs and symptoms of STROKE:    F-face looks uneven    A-arms unable to move or move unevenly    S-speech slurred or non-existent    T-time-call 911 as soon as signs and symptoms begin-DO NOT go       Back to bed or wait to see if you get better-TIME IS BRAIN. Warning Signs of HEART ATTACK     Call 911 if you have these symptoms:   Chest discomfort. Most heart attacks involve discomfort in the center of the chest that lasts more than a few minutes, or that goes away and comes back. It can feel like uncomfortable pressure, squeezing, fullness, or pain.  Discomfort in other areas of the upper body. Symptoms can include pain or discomfort in one or both arms, the back, neck, jaw, or stomach.  Shortness of breath with or without chest discomfort.  Other signs may include breaking out in a cold sweat, nausea, or lightheadedness. Don't wait more than five minutes to call 911 - MINUTES MATTER! Fast action can save your life. Calling 911 is almost always the fastest way to get lifesaving treatment. Emergency Medical Services staff can begin treatment when they arrive -- up to an hour sooner than if someone gets to the hospital by car. The discharge information has been reviewed with the patient. The patient verbalized understanding.   Discharge medications reviewed with the patient and appropriate educational materials and side effects teaching were provided.   ___________________________________________________________________________________________________________________________________

## 2019-06-03 ENCOUNTER — HOSPITAL ENCOUNTER (OUTPATIENT)
Dept: LACTATION | Age: 29
Discharge: HOME | End: 2019-06-03
Attending: OBSTETRICS & GYNECOLOGY
Payer: COMMERCIAL

## 2019-06-03 PROCEDURE — 99404 PREV MED CNSL INDIV APPRX 60: CPT

## 2019-06-03 NOTE — LACTATION NOTE
6/3/2019  Re: Leif Greek  [id number])    Dear Dr. Sharif Montalvo,     I saw Ziggy Nelson and his mother Davis Arnold in our Lactation office today. Mom came in today to get assistance with latching. Date Weight Comments   19 8-8 Birthweight   19 7-15.7 Discharge Weight   6-3-19 7-14 At [facility] [place]   6-3-19 7-13.8 Naked At [facility] OP Lactation       Silvano Bella was alert and ready to nurse. Mom reports in the last 24 hours he has started to do some latching. Assisted with supportive positioning. Although Zigyg Nelson was trying at this feeding his latch was pretty shallow and he could not stay on. Mom originally was considering using a nipple shield. Reviewed pros and cons of nipple shield. Since Ziggy Nelson has started to do some latching without it, he may just need a bit more time. Mom decided to continue to try at home for a good latch, but scheduled a follow up appointment for Friday to do a nipple shield if he is still inconsistent at that time. Mom will pump to check residual volume if latching. Milk supply is improving. Mom is averaging 50 ml per pumping. Estimated Needs:  By 1-2 weeks, baby needs 21-23 oz of breast milk/formula per day based on 8 feedings per day. Baby needs at least 75 ml/2.5 oz of breast milk/formula per feeding. The more often baby eats the less volume they need per feeding. Do a minimum of 60 ml for today working up to 75-90 ml per feeding over th next few days. Plan:  Continue to work at breast to achieve latch. Try most feedings at breast, but ok to pump and bottle if needed. Watch for good, sustained latch. Since already using formula, can use a formula bottle to get 1 pumping ahead. If by Friday, latch is still inconsistent, suggest using a nipple shield. Suggest feed and weigh with nipple shield use. If he will latch well, suggest pumping right after to evaluate residual volume to compare to how much is pumped if not nursing.       Follow-up:   To Ped 6-10-19. Repeat feed and weigh with nipple shield as needed at [facility] on Friday 6-7-19 2:30p. Call as needed before.     Sincerely,      Mark Aguero Kingsley 87, 65 [address], [address] [place] [address]

## 2019-06-03 NOTE — LACTATION NOTE
Outpatient Feeding Plan for Breastfeeding  [phone]  Patient: Brea Gross  Gestational Age: 40w 3d  Diagnosis:  V 24.1/Z39.1 Not latching. Lacy Delay Dr. Lorenzo Bauer  Start Time:  3261  Stop Time:  9482    Good, active breastfeeding is when baby is alert, tugging the nipple in long, deep pulls, and you can hear swallows every 1-2 sucks. By now Mom's milk should be in. Brief, light or shallow sucks or short rapid sucks without frequent swallows should not be considered a full breastfeeding. 1. Complete the following mouth exercises prior to feeding:  Gum Massage and Non-nutritive Sucking    2. Put the baby to the breast at least 4-6  times per day for 15-20 minutes per side, as long as he is latched well. If attempting, try for 10 minutes max. Finish first side before offering other side. Since mostly attempting at breast, can take a break from attempting for a few feedings each day as needed. Use: Breast Compression and Breast Massage      4. As needed after good nursing to increase volume, pump for 5-10 minutes after nursing. Try to pump within 15 minutes of breastfeeding. Be consistent. 5. If pumping and bottle feeding, give baby at least 75 ml/2.5 oz of breast milk/formula and double pump with massage and compression for 15 minutes. Estimated Needs:  By 1-2 weeks, baby needs 21-23 oz of breast milk/formula per day based on 8 feedings per day. Baby needs at least 75 ml/2.5 oz of breast milk/formula per feeding. The more often baby eats the less volume they need per feeding. Do a minimum of 60 ml for today working up to 75-90 ml per feeding over th next few days. Date Weight Comments   5-28-19 8-8 Birthweight   5-31-19 7-15.7 Discharge Weight   6-3-19 7-14 At [facility]   6-3-19 7-13.8 Naked At [facility] OP Lactation      Average weight gain for the first 3 months is 1oz per day.   Minimum weight gain in the first 3 months is 0.5 oz per day. Typically regaining to birth weight by 2 weeks. Baby last ate at 2 pm.  Nursed on R side 5-10 minutes after mom had pumped 50 ml at 1:40. Then took 50 ml by bottle. Jannet Pereyra was alert and ready to nurse. Mom reports in the last 24 hours he has started to do some latching. Assisted with supportive positioning. Although Jannet Pereyra was trying at this feeding his latch was pretty shallow and he could not stay on. Mom originally was considering using a nipple shield. Reviewed pros and cons of nipple shield. Since Jannet Pereyra has started to do some latching without it, he may just need a bit more time. Mom decided to continue to try at home for a good latch, but scheduled a follow up appointment for Friday to do a nipple shield if he is still inconsistent at that time. Mom will pump to check residual volume if latching. Milk supply is improving. Mom is averaging 50 ml per pumping. Baby's    Oral Digital Assessment:  Buried lingual frenulum. Light suck. Notched upper labial, thick frenulum into gum. Output:  Soaking wets. Yellow, runny, seedy, frequent stools. Appearance:  A little jaundiced. Did see Ped today, did not check level. Mom's    Nipples: Moderately wide base   Breasts:  Medium    Plan:  Continue to work at breast to achieve latch. Try most feedings at breast, but ok to pump and bottle if needed. Watch for good, sustained latch. Since already using formula, can use a formula bottle to get 1 pumping ahead. If by Friday, latch is still inconsistent, suggest using a nipple shield. Suggest feed and weigh with nipple shield use. If he will latch well, suggest pumping right after to evaluate residual volume to compare to how much is pumped if not nursing. Follow-up: To Ped 6-10-19. Repeat feed and weigh with nipple shield as needed at [facility] on Friday 6-7-19 2:30p. Call as needed before.

## 2019-06-07 ENCOUNTER — HOSPITAL ENCOUNTER (OUTPATIENT)
Dept: LACTATION | Age: 29
Discharge: HOME | End: 2019-06-07
Payer: COMMERCIAL

## 2019-06-07 PROCEDURE — 99403 PREV MED CNSL INDIV APPRX 45: CPT

## 2019-06-07 NOTE — LACTATION NOTE
6/7/2019  Re: Nargisla Plant 7-40-50)    Dear Dr. Lani Hawthorne,     I saw Perez Flanagan and his mother Yodit Pompa in our Lactation office again today. Mom came in for a follow up feed and weigh.      Date Weight Comments   5-28-19 8-8 Birthweight   5-31-19 7-15.7 Discharge Weight   6-3-19 7-14 At [facility]   6-3-19 7-13.8 [facility]    6-7-19 8-3.2 Naked [facility] Lactation    Gained 5 oz in 4 days on same scale. Feed and Weigh  1st Breast L with medium nipple shield for 14 min - 20 ml  2nd Breast R (no shield) for 12 min - 18 ml  Total intake at breast  38 ml = ~ 1.25 oz     Alisha Narvaez was alert and interested in nursing. Assisted with attempt on L. Mom reports he has not been latching to L. Discussed trying with a shield and mom agreed. Applied a medium nipple shield and Perez Flanagan was able to latch easily. He was a bit sleepy, but he stayed on well. Did a few swallows. When we moved him to the R he was able to latch fairly well without the nipple shield. Again he was sleepy and not overly interested once at breast, but he latched and was able to take milk at breast.  Mom's supply has improved and she is pumping more than he is taking. Perez Flanagan has made a lot of improvement at the breast.  Need to monitor if he is able to take a full feeding from the breast without needing additional milk after. At today's feeding he had eaten 45-60 ml about 1.5 hours prior to our feeding. Estimated Needs:  Baby needs 21-23 oz of breast milk/formula per day based on 8 feedings per day. Baby needs 75-90 ml/2.5-3  oz of breast milk/formula per feeding. The more often baby eats the less volume they need per feeding. Do a minimum of 60 ml for today working up to 75-90 ml per feeding over th next few days. Plan:  Continue with on-demand feeding. Use shield as needed. For now continue to pump after nursing. As needed can offer milk after nursing.   Slowly work in additional feedings at breast.  Follow residual milk volume. Pump after nursing to compare it to pumping in place of a feeding.      Follow-up: To Ped 6-10-19. Will call Tuesday 6-11-19. Call as needed before.     Sincerely,      Mark Luevano Kingsley 87, [address] [place] [address], [address] [facility]

## 2019-06-07 NOTE — LACTATION NOTE
Outpatient Feeding Plan for Breastfeeding  [id number]  Patient: Ruthie Mac  Gestational Age: 40w 3d  Diagnosis:  V 24.1/Z39.1 Not latching. Lavonne Lang  Start Time:  7135  Stop Time:  3247    Good, active breastfeeding is when baby is alert, tugging the nipple in long, deep pulls, and you can hear swallows every 1-2 sucks. By now Mom's milk should be in. Brief, light or shallow sucks or short rapid sucks without frequent swallows should not be considered a full breastfeeding. 1. Complete the following mouth exercises prior to feeding:  Gum Massage and Non-nutritive Sucking    2. Put the baby to the breast 3-4 times per day for 15-20 minutes per side. Finish first side before offering other side. As nursing is going well, add in additional feedings at the breast.  One additional feeding every few days until fully nursing. Use: Nipple Shield, Breast Compression and Breast Massage  Use nipple shield as needed. To wean off of the shield start feeding with the shield and remove 1-2 minutes into feeding, try on just breast.  If baby latches, finish nursing. If not, re-apply shield and finish feeding. Try shield removal once nursing is going well, 1-2 times per day. 3. As needed, after breastfeeding, supplement your baby by bottle with breast milk/formula: 30ml = 1oz. Position the baby upright to bottle feed. Ensure the nipple is all the way in the baby's mouth and lips are flanged around the bottle. 4. Pump for 5-10 minutes after nursing. Try to pump within 15 minutes of breastfeeding. Choose 2-3 times per day to pump after nursing when using the shield. Be consistent. 5. If pumping and bottle feeding, give baby 75-90 ml/2.5-3 oz of breast milk/formula and double pump with massage and compression for 15 minutes. Estimated Needs:  Baby needs 21-23 oz of breast milk/formula per day based on 8 feedings per day. Baby needs 75-90 ml/2.5-3  oz of breast milk/formula per feeding. The more often baby eats the less volume they need per feeding. Do a minimum of 60 ml for today working up to 75-90 ml per feeding over th next few days.      Date Weight Comments   5-28-19 8-8 Birthweight   5-31-19 7-15.7 Discharge Weight   6-3-19 7-14 At [facility]   6-3-19 7-13.8 Naked At [address] OP Lactation    6-7-19 8-3.2 Naked At [address] OP Lactation    Gained 5 oz in 4 days on same scale.   Average weight gain for the first 3 months is 1oz per day. Minimum weight gain in the first 3 months is 0.5 oz per day. Typically regaining to birth weight by 2 weeks. Date Side Position Time Before Wt. After Wt Total   6-7-19 L* Cross cradle  1427 14 min  3736 3756 20 ml     R  1447 3718** 3736 18 ml   Total: 38 ml ~1.25 oz   *Medium nipple shield. ** Diaper Change     Baby last ate 2oz at 1 pm, prior to this feeding. Pumped at 11 and got 100 ml total.  About 50 ml on each side. Get less volume when pumping after nursing. Taking at least 75 ml per feeding. Tries at breast 3 times per day. Returning to work at 8 weeks. Teagan Fortune was alert and interested in nursing. Assisted with attempt on L. Mom reports he has not been latching to L. Discussed trying with a shield and mom agreed. Applied a medium nipple shield and Teagan Fortune was able to latch easily. He was a bit sleepy, but he stayed on well. Did a few swallows. When we moved him to the R he was able to latch fairly well without the nipple shield. Again he was sleepy and not overly interested once at breast, but he latched and was able to take milk at breast.  Mom's supply has improved and she is pumping more than he is taking. Teagan Fortune has made a lot of improvement at the breast.  Need to monitor if he is able to take a full feeding from the breast without needing additional milk after. At today's feeding he had eaten 45-60 ml about 1.5 hours prior to our feeding. Baby's    Oral Digital Assessment:  Ok suck. Seems stronger today. Output:  Soaking wets. Yellow, runny, seedy, frequent stools. Mom's               Nipples: Moderately wide base              Breasts:  Medium    Plan:  Continue with on-demand feeding. Use shield as needed. For now continue to pump after nursing. As needed can offer milk after nursing. Slowly work in additional feedings at breast.  Follow residual milk volume. Pump after nursing to compare it to pumping in place of a feeding. Follow-up: To Ped 6-10-19. Will call Tuesday 6-11-19. Call as needed before.
